# Patient Record
Sex: MALE | Race: OTHER | HISPANIC OR LATINO | URBAN - METROPOLITAN AREA
[De-identification: names, ages, dates, MRNs, and addresses within clinical notes are randomized per-mention and may not be internally consistent; named-entity substitution may affect disease eponyms.]

---

## 2023-07-05 ENCOUNTER — EMERGENCY (EMERGENCY)
Facility: HOSPITAL | Age: 38
LOS: 1 days | End: 2023-07-05
Attending: EMERGENCY MEDICINE
Payer: SELF-PAY

## 2023-07-05 VITALS
HEART RATE: 68 BPM | SYSTOLIC BLOOD PRESSURE: 130 MMHG | TEMPERATURE: 98 F | DIASTOLIC BLOOD PRESSURE: 79 MMHG | RESPIRATION RATE: 16 BRPM | OXYGEN SATURATION: 97 %

## 2023-07-05 VITALS
TEMPERATURE: 98 F | OXYGEN SATURATION: 97 % | RESPIRATION RATE: 16 BRPM | DIASTOLIC BLOOD PRESSURE: 87 MMHG | SYSTOLIC BLOOD PRESSURE: 128 MMHG | WEIGHT: 156.31 LBS | HEART RATE: 80 BPM | HEIGHT: 66 IN

## 2023-07-05 LAB
A1C WITH ESTIMATED AVERAGE GLUCOSE RESULT: 12.9 % — HIGH (ref 4–5.6)
ACETONE SERPL-MCNC: NEGATIVE — SIGNIFICANT CHANGE UP
ALBUMIN SERPL ELPH-MCNC: 4.1 G/DL — SIGNIFICANT CHANGE UP (ref 3.3–5.2)
ALP SERPL-CCNC: 157 U/L — HIGH (ref 40–120)
ALT FLD-CCNC: 8 U/L — SIGNIFICANT CHANGE UP
ANION GAP SERPL CALC-SCNC: 10 MMOL/L — SIGNIFICANT CHANGE UP (ref 5–17)
AST SERPL-CCNC: 13 U/L — SIGNIFICANT CHANGE UP
BASE EXCESS BLDV CALC-SCNC: 2.2 MMOL/L — SIGNIFICANT CHANGE UP (ref -2–3)
BASOPHILS # BLD AUTO: 0.03 K/UL — SIGNIFICANT CHANGE UP (ref 0–0.2)
BASOPHILS NFR BLD AUTO: 0.5 % — SIGNIFICANT CHANGE UP (ref 0–2)
BILIRUB SERPL-MCNC: 0.4 MG/DL — SIGNIFICANT CHANGE UP (ref 0.4–2)
BUN SERPL-MCNC: 13.7 MG/DL — SIGNIFICANT CHANGE UP (ref 8–20)
CALCIUM SERPL-MCNC: 8.9 MG/DL — SIGNIFICANT CHANGE UP (ref 8.4–10.5)
CHLORIDE SERPL-SCNC: 97 MMOL/L — SIGNIFICANT CHANGE UP (ref 96–108)
CO2 SERPL-SCNC: 26 MMOL/L — SIGNIFICANT CHANGE UP (ref 22–29)
CREAT SERPL-MCNC: 0.42 MG/DL — LOW (ref 0.5–1.3)
EGFR: 141 ML/MIN/1.73M2 — SIGNIFICANT CHANGE UP
EOSINOPHIL # BLD AUTO: 0.07 K/UL — SIGNIFICANT CHANGE UP (ref 0–0.5)
EOSINOPHIL NFR BLD AUTO: 1.1 % — SIGNIFICANT CHANGE UP (ref 0–6)
ESTIMATED AVERAGE GLUCOSE: 324 MG/DL — HIGH (ref 68–114)
GLUCOSE SERPL-MCNC: 305 MG/DL — HIGH (ref 70–99)
HCO3 BLDV-SCNC: 27 MMOL/L — SIGNIFICANT CHANGE UP (ref 22–29)
HCT VFR BLD CALC: 41.2 % — SIGNIFICANT CHANGE UP (ref 39–50)
HGB BLD-MCNC: 15.2 G/DL — SIGNIFICANT CHANGE UP (ref 13–17)
IMM GRANULOCYTES NFR BLD AUTO: 0.2 % — SIGNIFICANT CHANGE UP (ref 0–0.9)
LYMPHOCYTES # BLD AUTO: 4 K/UL — HIGH (ref 1–3.3)
LYMPHOCYTES # BLD AUTO: 63.5 % — HIGH (ref 13–44)
MCHC RBC-ENTMCNC: 30.2 PG — SIGNIFICANT CHANGE UP (ref 27–34)
MCHC RBC-ENTMCNC: 36.9 GM/DL — HIGH (ref 32–36)
MCV RBC AUTO: 81.9 FL — SIGNIFICANT CHANGE UP (ref 80–100)
MONOCYTES # BLD AUTO: 0.52 K/UL — SIGNIFICANT CHANGE UP (ref 0–0.9)
MONOCYTES NFR BLD AUTO: 8.3 % — SIGNIFICANT CHANGE UP (ref 2–14)
NEUTROPHILS # BLD AUTO: 1.67 K/UL — LOW (ref 1.8–7.4)
NEUTROPHILS NFR BLD AUTO: 26.4 % — LOW (ref 43–77)
PCO2 BLDV: 45 MMHG — SIGNIFICANT CHANGE UP (ref 42–55)
PH BLDV: 7.39 — SIGNIFICANT CHANGE UP (ref 7.32–7.43)
PLATELET # BLD AUTO: 242 K/UL — SIGNIFICANT CHANGE UP (ref 150–400)
PO2 BLDV: 74 MMHG — HIGH (ref 25–45)
POTASSIUM SERPL-MCNC: 3.9 MMOL/L — SIGNIFICANT CHANGE UP (ref 3.5–5.3)
POTASSIUM SERPL-SCNC: 3.9 MMOL/L — SIGNIFICANT CHANGE UP (ref 3.5–5.3)
PROT SERPL-MCNC: 6.9 G/DL — SIGNIFICANT CHANGE UP (ref 6.6–8.7)
RBC # BLD: 5.03 M/UL — SIGNIFICANT CHANGE UP (ref 4.2–5.8)
RBC # FLD: 11.7 % — SIGNIFICANT CHANGE UP (ref 10.3–14.5)
SAO2 % BLDV: 96.6 % — SIGNIFICANT CHANGE UP
SODIUM SERPL-SCNC: 133 MMOL/L — LOW (ref 135–145)
WBC # BLD: 6.3 K/UL — SIGNIFICANT CHANGE UP (ref 3.8–10.5)
WBC # FLD AUTO: 6.3 K/UL — SIGNIFICANT CHANGE UP (ref 3.8–10.5)

## 2023-07-05 PROCEDURE — 99283 EMERGENCY DEPT VISIT LOW MDM: CPT

## 2023-07-05 PROCEDURE — 85025 COMPLETE CBC W/AUTO DIFF WBC: CPT

## 2023-07-05 PROCEDURE — 99284 EMERGENCY DEPT VISIT MOD MDM: CPT

## 2023-07-05 PROCEDURE — 83036 HEMOGLOBIN GLYCOSYLATED A1C: CPT

## 2023-07-05 PROCEDURE — 36415 COLL VENOUS BLD VENIPUNCTURE: CPT

## 2023-07-05 PROCEDURE — 82009 KETONE BODYS QUAL: CPT

## 2023-07-05 PROCEDURE — 82962 GLUCOSE BLOOD TEST: CPT

## 2023-07-05 PROCEDURE — 82803 BLOOD GASES ANY COMBINATION: CPT

## 2023-07-05 PROCEDURE — 80053 COMPREHEN METABOLIC PANEL: CPT

## 2023-07-05 RX ORDER — METFORMIN HYDROCHLORIDE 850 MG/1
1 TABLET ORAL
Qty: 60 | Refills: 0
Start: 2023-07-05 | End: 2023-08-03

## 2023-07-05 RX ORDER — SODIUM CHLORIDE 9 MG/ML
1000 INJECTION INTRAMUSCULAR; INTRAVENOUS; SUBCUTANEOUS ONCE
Refills: 0 | Status: COMPLETED | OUTPATIENT
Start: 2023-07-05 | End: 2023-07-05

## 2023-07-05 RX ADMIN — SODIUM CHLORIDE 1000 MILLILITER(S): 9 INJECTION INTRAMUSCULAR; INTRAVENOUS; SUBCUTANEOUS at 22:50

## 2023-07-05 NOTE — ED PROVIDER NOTE - CLINICAL SUMMARY MEDICAL DECISION MAKING FREE TEXT BOX
38M hx DM presents for generalized mild body tingling for 5 years. Initial . Will r/o DKA. If negative will dc with outpatient endo followup and rx for Metformin 38M hx DM presents for mild generalized body tingling for 5 years. No objective neurologic deficits on exam. Initial . Will r/o DKA. If negative will dc with outpatient endo followup and rx for Metformin 38M hx DM presents for mild generalized body tingling for 5 years. Has been off his diabetes medications for 5 years, does not believe he is on insulin believes it may have been metformin. No objective neurologic deficits on exam. Initial . Will r/o DKA. If negative will dc with outpatient endo followup and rx for Metformin.

## 2023-07-05 NOTE — ED PROVIDER NOTE - OBJECTIVE STATEMENT
38M hx DM presents for 5 years of body tingling. Pt states he has not been on diabetes medication for 5 years due to insurance issues and has mild tingling since then. Does not remember the name of the medication. Otherwise no N/V, abd pain, fever, chills, CP, SOB

## 2023-07-05 NOTE — ED PROVIDER NOTE - PHYSICAL EXAMINATION
General: Awake, alert, well appearing  HEENT: EOMI. No scleral icterus or conjunctival injection. Moist mucous membranes.   Neck:. Soft and supple. Trachea midline  Cardiac: Extremities warm and well perfused. No LE edema.  Resp: No respiratory distress or accessory muscle use.   Abd: Nontender. Soft, non-distended. No overlying skin changes  Skin: No rashes, abrasions, or lacerations.  Neuro: AO x 4. Moves all extremities symmetrically. Motor strength and sensation grossly intact.  Psych: Appropriate mood and affect

## 2023-07-05 NOTE — ED PROVIDER NOTE - ATTENDING CONTRIBUTION TO CARE
Crow: I performed a face to face bedside interview with patient regarding history of present illness, review of symptoms and past medical history. I completed an independent physical exam.  I have discussed patient's plan of care with resident.   I agree with note as stated above including HISTORY OF PRESENT ILLNESS, HIV, PAST MEDICAL/SURGICAL/FAMILY/SOCIAL HISTORY, ALLERGIES AND HOME MEDICATIONS, REVIEW OF SYSTEMS, PHYSICAL EXAM, MEDICAL DECISION MAKING and any PROGRESS NOTES during the time I functioned as the attending physician for this patient unless otherwise noted. My brief assessment is as follows: 38M hx DM presents for mild generalized body tingling for 5 years. Has been off his diabetes medications for 5 years, does not believe he is on insulin believes it may have been metformin. No objective neurologic deficits on exam. Initial . Will r/o DKA. If negative will dc with outpatient endo followup and rx for Metformin.

## 2023-07-05 NOTE — ED PROVIDER NOTE - NSFOLLOWUPINSTRUCTIONS_ED_ALL_ED_FT
Conceptos básicos de la diabetes mellitus  Diabetes Mellitus Basics    La diabetes mellitus, o (diabetes) es wendy enfermedad prolongada (crónica). Se produce cuando el cuerpo no utiliza correctamente el azúcar (glucosa) que se libera de los alimentos después de comer.    La diabetes mellitus puede deberse a blanca de estos problemas o a ambos:  El páncreas no produce suficiente cantidad de wendy hormona llamada insulina.  El cuerpo no reacciona de forma normal a la insulina que produce.  La insulina permite que la glucosa ingrese a las células del cuerpo. [place] le proporciona energía. Si tiene diabetes, la glucosa no puede ingresar a las células. [place] produce un aumento de la glucemia (hiperglucemia).    Cómo tratar y controlar la diabetes  Es posible que tenga que administrarse insulina u otros medicamentos para la diabetes todos los días para mantener el nivel de glucosa en la pricilla equilibrado. Si le recetan insulina, aprenderá cómo aplicársela con inyecciones. Es posible que deba ajustar la cantidad de insulina en función de los alimentos que coma.    Tendrá que controlarse los niveles de glucemia con un monitor de glucosa monika se lo haya indicado el médico. Las lecturas pueden ayudar a determinar si tiene un nivel bajo o alto de glucemia.    Generalmente, los resultados de los niveles de glucemia deben ser los siguientes:  Antes de las comidas (preprandial): de 80 a 130 mg/dl (de 4,4 a 7,2 mmol/l).  Después de las comidas (posprandial): por debajo de 180 mg/dl (10 mmol/l).  Nivel de hemoglobina A1c (HbA1c): menos del 7 %.  El médico fijará objetivos de tratamiento para usted.    Cumpla con todas las visitas de seguimiento. [place] es importante.    Siga estas instrucciones en child casa:  Medicamentos para la diabetes    Use los medicamentos para la diabetes monika se lo haya indicado el médico. Noe wendy lista de los medicamentos para la diabetes aquí:  Nombre del medicamento: ______________________________  Cantidad (dosis): ________________ Hora (a. m./p. m.): _______________ Notas: ___________________________________  Nombre del medicamento: ______________________________  Cantidad (dosis): ________________ Hora (a. m./p. m.): _______________ Notas: ___________________________________  Nombre del medicamento: ______________________________  Cantidad (dosis): ________________ Hora (a. m./p. m.): _______________ Notas: ___________________________________  Insulina    Si usa insulina, noe wendy lista de los tipos de insulina que usa aquí:  Tipo de insulina: ______________________________  Cantidad (dosis): ________________ Hora (a. m./p. m.): _______________Notas: ___________________________________  Tipo de insulina: ______________________________  Cantidad (dosis): ________________ Hora (a. m./p. m.): _______________ Notas: ___________________________________  Tipo de insulina: ______________________________  Cantidad (dosis): ________________ Hora (a. m./p. m.): _______________ Notas: ___________________________________  Tipo de insulina: ______________________________  Cantidad (dosis): ________________ Hora (a. m./p. m.): _______________ Notas: ___________________________________  Tipo de insulina: ______________________________  Cantidad (dosis): ________________ Hora (a. m./p. m.): _______________ Notas: ___________________________________  Cómo controlar la glucemia      Contrólese los niveles de glucemia con un monitor de glucosa monika se lo haya indicado el médico.    Anote las veces que se controla los niveles de glucosa:  Hora: _______________ Notas: ___________________________________  Hora: _______________ Notas: ___________________________________  Hora: _______________ Notas: ___________________________________  Hora: _______________ Notas: ___________________________________  Hora: _______________ Notas: ___________________________________  Hora: _______________ Notas: ___________________________________  Nivel bajo de glucemia    El nivel bajo de glucemia (hipoglucemia) se produce cuando la glucosa es igual o bassam que 70 mg/dl (3.9 mmol/l). Entre los síntomas, se pueden incluir los siguientes:  Sentir:  Hambre.  Sudoración y piel húmeda.  Irritabilidad o enfado con facilidad.  Mareos.  Somnolencia.  Tener:  Latidos cardíacos acelerados.  Dolor de casie.  Cambios en la visión.  Hormigueo o adormecimiento alrededor de la boca, labios o lengua.  Dificultades para hacer lo siguiente:  Moverse (coordinación).  Dormir.  Cómo tratar un nivel bajo de glucemia    Para tratar un nivel bajo de glucemia, ingiera un alimento o wendy bebida azucarada de inmediato. Si puede pensar con claridad y tragar de manera cruz, siga la julius 15/15, que consiste en lo siguiente:  Urbank 15 gramos de un hidrato de carbono (carbohidrato) de acción rápida, monika le indicó child médico.  Algunos hidratos de carbono de acción rápida son:  Comprimidos de glucosa: tome 3 o 4 comprimidos.  Caramelos duros: coma 3 a 5 unidades.  Jugo de fruta: tucker 4 onzas (120 ml).  Refresco común (no dietético): tucker de 4 a 6 onzas (de 120 a 180 ml).  Miel o azúcar: coma 1 cucharada (15 ml).  Verifique allen niveles de glucemia 15 minutes después de ingerir el hidrato de carbono.  Si la glucosa todavía es igual o bassam que 70 mg/dl (3.9 mmol/l), ingiera nuevamente 15 gramos de un hidrato de carbono.  Si la glucosa no supera los 70 mg/dl (3.9 mmol/l) después de 3 intentos, solicite ayuda de inmediato.  Ingiera wendy comida o wendy colación en el transcurso de 1 hora después de que la glucosa se haya normalizado.  Cómo tratar un nivel muy bajo de glucemia    Si la glucosa es igual o bassam que 54 mg/dl (3 mmol/l), significa que child nivel de glucemia está muy bajo (hipoglucemia grave).    [place] es wendy emergencia. No espere a alexx si los síntomas desaparecen. Solicite atención médica de inmediato. Comuníquese con el servicio de emergencias de child localidad (911 en [place]). No conduzca por allen propios medios hasta el hospital.    Preguntas para hacerle al médico  ¿Es necesario que converse con un educador en el cuidado de la diabetes?  ¿Qué equipos necesitaré para cuidarme en casa?  ¿Qué medicamentos para la diabetes necesito? ¿Cuándo lloyd tomarlos?  ¿Con qué frecuencia lloyd comprobar mis niveles de glucemia?  ¿A qué número puedo llamar si tengo preguntas?  ¿Cuándo es mi visita de seguimiento?  ¿Dónde puedo encontrar un deniz de apoyo para las personas con diabetes?  Dónde buscar más información  American Diabetes Association (Asociación Estadounidense de la Diabetes): www.diabetes.org  Association of Diabetes Care and Education Specialists (Asociación de Especialistas en Atención y Educación sobre la Diabetes): www.diabeteseducator.org  Comuníquese con un médico si:  El nivel de glucemia es mayor o igual que 240 mg/dl (13.3 mmol/dl) megan 2 días seguidos.  Ha estado enfermo o ha tenido fiebre megan 2 días o más y no mejora.  Tiene alguno de estos problemas megan más de 6 horas:  No puede comer ni beber.  Siente náuseas.  Vomita.  Tiene diarrea.  Solicite ayuda de inmediato si:  Child nivel de glucemia está por debajo de 54 mg/dl (3 mmol/l).  Se siente confundido.  Tiene problemas para pensar con claridad.  Tiene dificultad para respirar.  Estos síntomas pueden representar un problema grave que constituye wendy emergencia. No espere a alexx si los síntomas desaparecen. Solicite atención médica de inmediato. Comuníquese con el servicio de emergencias de child localidad (911 en [place]). No conduzca por allen propios medios hasta el hospital.    Resumen  La diabetes mellitus es wendy enfermedad crónica que se produce cuando el cuerpo no utiliza correctamente el azúcar (glucosa) que se libera de los alimentos después de comer.  Aplíquese la insulina y tome los medicamentos para la diabetes monika se lo hayan indicado.  Controle child nivel de glucemia todos los días, con la frecuencia que le hayan indicado.  Cumpla con todas las visitas de seguimiento. [place] es importante. - Follow up with your doctor within 2-3 days.   - Follow up with Endocrinologist, see information above.   - Bring results with you to the appointment.   - Take Metformin twice per day.   - Return to the ED for any new or worsening symptoms.       Conceptos básicos de la diabetes mellitus  Diabetes Mellitus Basics    La diabetes mellitus, o (diabetes) es wendy enfermedad prolongada (crónica). Se produce cuando el cuerpo no utiliza correctamente el azúcar (glucosa) que se libera de los alimentos después de comer.    La diabetes mellitus puede deberse a blanca de estos problemas o a ambos:  El páncreas no produce suficiente cantidad de wendy hormona llamada insulina.  El cuerpo no reacciona de forma normal a la insulina que produce.  La insulina permite que la glucosa ingrese a las células del cuerpo. [place] le proporciona energía. Si tiene diabetes, la glucosa no puede ingresar a las células. [place] produce un aumento de la glucemia (hiperglucemia).    Cómo tratar y controlar la diabetes  Es posible que tenga que administrarse insulina u otros medicamentos para la diabetes todos los días para mantener el nivel de glucosa en la pricilla equilibrado. Si le recetan insulina, aprenderá cómo aplicársela con inyecciones. Es posible que deba ajustar la cantidad de insulina en función de los alimentos que coma.    Tendrá que controlarse los niveles de glucemia con un monitor de glucosa monika se lo haya indicado el médico. Las lecturas pueden ayudar a determinar si tiene un nivel bajo o alto de glucemia.    Generalmente, los resultados de los niveles de glucemia deben ser los siguientes:  Antes de las comidas (preprandial): de 80 a 130 mg/dl (de 4,4 a 7,2 mmol/l).  Después de las comidas (posprandial): por debajo de 180 mg/dl (10 mmol/l).  Nivel de hemoglobina A1c (HbA1c): menos del 7 %.  El médico fijará objetivos de tratamiento para usted.    Cumpla con todas las visitas de seguimiento. [place] es importante.    Siga estas instrucciones en child casa:  Medicamentos para la diabetes    Use los medicamentos para la diabetes monika se lo haya indicado el médico. Noe wendy lista de los medicamentos para la diabetes aquí:  Nombre del medicamento: ______________________________  Cantidad (dosis): ________________ Hora (a. m./p. m.): _______________ Notas: ___________________________________  Nombre del medicamento: ______________________________  Cantidad (dosis): ________________ Hora (a. m./p. m.): _______________ Notas: ___________________________________  Nombre del medicamento: ______________________________  Cantidad (dosis): ________________ Hora (a. m./p. m.): _______________ Notas: ___________________________________  Insulina    Si usa insulina, noe wendy lista de los tipos de insulina que usa aquí:  Tipo de insulina: ______________________________  Cantidad (dosis): ________________ Hora (a. m./p. m.): _______________Notas: ___________________________________  Tipo de insulina: ______________________________  Cantidad (dosis): ________________ Hora (a. m./p. m.): _______________ Notas: ___________________________________  Tipo de insulina: ______________________________  Cantidad (dosis): ________________ Hora (a. m./p. m.): _______________ Notas: ___________________________________  Tipo de insulina: ______________________________  Cantidad (dosis): ________________ Hora (a. m./p. m.): _______________ Notas: ___________________________________  Tipo de insulina: ______________________________  Cantidad (dosis): ________________ Hora (a. m./p. m.): _______________ Notas: ___________________________________  Cómo controlar la glucemia      Contrólese los niveles de glucemia con un monitor de glucosa monika se lo haya indicado el médico.    Anote las veces que se controla los niveles de glucosa:  Hora: _______________ Notas: ___________________________________  Hora: _______________ Notas: ___________________________________  Hora: _______________ Notas: ___________________________________  Hora: _______________ Notas: ___________________________________  Hora: _______________ Notas: ___________________________________  Hora: _______________ Notas: ___________________________________  Nivel bajo de glucemia    El nivel bajo de glucemia (hipoglucemia) se produce cuando la glucosa es igual o bassam que 70 mg/dl (3.9 mmol/l). Entre los síntomas, se pueden incluir los siguientes:  Sentir:  Hambre.  Sudoración y piel húmeda.  Irritabilidad o enfado con facilidad.  Mareos.  Somnolencia.  Tener:  Latidos cardíacos acelerados.  Dolor de casie.  Cambios en la visión.  Hormigueo o adormecimiento alrededor de la boca, labios o lengua.  Dificultades para hacer lo siguiente:  Moverse (coordinación).  Dormir.  Cómo tratar un nivel bajo de glucemia    Para tratar un nivel bajo de glucemia, ingiera un alimento o wendy bebida azucarada de inmediato. Si puede pensar con claridad y tragar de manera cruz, siga la julius 15/15, que consiste en lo siguiente:  Tangier 15 gramos de un hidrato de carbono (carbohidrato) de acción rápida, monika le indicó child médico.  Algunos hidratos de carbono de acción rápida son:  Comprimidos de glucosa: tome 3 o 4 comprimidos.  Caramelos duros: coma 3 a 5 unidades.  Jugo de fruta: tucker 4 onzas (120 ml).  Refresco común (no dietético): tucker de 4 a 6 onzas (de 120 a 180 ml).  Miel o azúcar: coma 1 cucharada (15 ml).  Verifique allen niveles de glucemia 15 minutes después de ingerir el hidrato de carbono.  Si la glucosa todavía es igual o bassam que 70 mg/dl (3.9 mmol/l), ingiera nuevamente 15 gramos de un hidrato de carbono.  Si la glucosa no supera los 70 mg/dl (3.9 mmol/l) después de 3 intentos, solicite ayuda de inmediato.  Ingiera wendy comida o wendy colación en el transcurso de 1 hora después de que la glucosa se haya normalizado.  Cómo tratar un nivel muy bajo de glucemia    Si la glucosa es igual o bassam que 54 mg/dl (3 mmol/l), significa que child nivel de glucemia está muy bajo (hipoglucemia grave).    [place] es wendy emergencia. No espere a alexx si los síntomas desaparecen. Solicite atención médica de inmediato. Comuníquese con el servicio de emergencias de child localidad (911 en [place]). No conduzca por allen propios medios hasta el hospital.    Preguntas para hacerle al médico  ¿Es necesario que converse con un educador en el cuidado de la diabetes?  ¿Qué equipos necesitaré para cuidarme en casa?  ¿Qué medicamentos para la diabetes necesito? ¿Cuándo lloyd tomarlos?  ¿Con qué frecuencia lloyd comprobar mis niveles de glucemia?  ¿A qué número puedo llamar si tengo preguntas?  ¿Cuándo es mi visita de seguimiento?  ¿Dónde puedo encontrar un deniz de apoyo para las personas con diabetes?  Dónde buscar más información  American Diabetes Association (Asociación Estadounidense de la Diabetes): www.diabetes.org  Association of Diabetes Care and Education Specialists (Asociación de Especialistas en Atención y Educación sobre la Diabetes): www.diabeteseducator.org  Comuníquese con un médico si:  El nivel de glucemia es mayor o igual que 240 mg/dl (13.3 mmol/dl) megan 2 días seguidos.  Ha estado enfermo o ha tenido fiebre megan 2 días o más y no mejora.  Tiene alguno de estos problemas megan más de 6 horas:  No puede comer ni beber.  Siente náuseas.  Vomita.  Tiene diarrea.  Solicite ayuda de inmediato si:  Child nivel de glucemia está por debajo de 54 mg/dl (3 mmol/l).  Se siente confundido.  Tiene problemas para pensar con claridad.  Tiene dificultad para respirar.  Estos síntomas pueden representar un problema grave que constituye wendy emergencia. No espere a alexx si los síntomas desaparecen. Solicite atención médica de inmediato. Comuníquese con el servicio de emergencias de child localidad (911 en [place]). No conduzca por allen propios medios hasta el hospital.    Resumen  La diabetes mellitus es wendy enfermedad crónica que se produce cuando el cuerpo no utiliza correctamente el azúcar (glucosa) que se libera de los alimentos después de comer.  Aplíquese la insulina y tome los medicamentos para la diabetes monika se lo hayan indicado.  Controle child nivel de glucemia todos los días, con la frecuencia que le hayan indicado.  Cumpla con todas las visitas de seguimiento. [place] es importante.

## 2023-07-05 NOTE — ED PROVIDER NOTE - PATIENT PORTAL LINK FT
You can access the FollowMyHealth Patient Portal offered by [facility] by registering at the following website: [email or web address]. By joining [facility]’s FollowMyHealth portal, you will also be able to view your health information using other applications (apps) compatible with our system.

## 2023-07-05 NOTE — ED ADULT NURSE NOTE - NSFALLUNIVINTERV_ED_ALL_ED
Bed/Stretcher in lowest position, wheels locked, appropriate side rails in place/Call bell, personal items and telephone in reach/Instruct patient to call for assistance before getting out of bed/chair/stretcher/Non-slip footwear applied when patient is off stretcher/[place] to call system/Physically safe environment - no spills, clutter or unnecessary equipment/Purposeful proactive rounding/Room/bathroom lighting operational, light cord in reach

## 2023-07-05 NOTE — ED ADULT TRIAGE NOTE - CHIEF COMPLAINT QUOTE
pt arrived states he feels his glucose is off states he lost his insurance and has not been able to get his medications states he has never taking insulin but endorses taking meds by mouth. pt endorses tingling in the arms and lightheadedness. glucose obtained in triage pt mentating well no acute distress noted

## 2023-07-05 NOTE — ED ADULT NURSE NOTE - OBJECTIVE STATEMENT
Assumed pt care 2230. A&Ox4. PMH of DM. Pt states he is here to get his blood sugar checked. PT has not taken medication for 5 years due to insurance issues. Pt denies any CP, SOB, HA, dizziness, abd pain, NVD, fever/chills. Resp even and unlabored. NAD present. Pt made aware of plan of care and verbalized understanding.

## 2023-07-05 NOTE — ED PROVIDER NOTE - CARE PROVIDER_API CALL
Yuliet Broderick  Internal Medicine  [address] [place] [id number]  Phone: [phone]  Fax: [phone]  Follow Up Time: 4-6 Days